# Patient Record
(demographics unavailable — no encounter records)

---

## 2025-02-12 NOTE — DISCUSSION/SUMMARY
[de-identified] : After thorough history full examination and review of the x-rays.  It was explained to the patient that she does have some mild joint effusion.  With some small evidence of osteophyte/arthritic changes.  She was explained the different modalities of treatment.  Since she is an avid walker and does complain of some swelling.  She was explained that there may be a small tear within the meniscus or just an aggravation from her arthritic changes.  As this may improve but also return depending on her activities.  She was explained that further diagnostic testing is warranted including an MRI of the right knee.  For which she stated she would like to hold off at this particular time.  Since she does not experience actual pain.  She states that she would like to hopefully control some of the swelling and return back to her normal activities.  She was explained the different ways of receiving anti-inflammatories which could be through creams as well as oral as well as injections.  And it was decided that we will treat the most conservative way with Voltaren creams.  She was advised to continue with leg elevation along with ice packs/moist heat and the creams as needed.  She was it was also suggested that she can try the anti-inflammatories as needed as well should she feel any discomfort.  She is presently scheduled to go for another long walk/hike in another week.  And explained that if there is any cartilage/meniscus type discomfort this may be something she will experience again and again.  However it is decided that we will continue conservative management and should this pain persist or worsen return back to the office for a possible injection or MRI to evaluate the cartilage thoroughly.  But at the present time she will continue with this conservative management.  45 minutes were spent, face to face, in direct consultation with the patient. This includes reviewing the natural history of their Dx., eliciting the history, performing an orthopedic exam, review of the x-ray findings, forming a differential Dx and discussing all treatment options. This Includes both surgical and non-surgical treatments. I also reviewed all the risks and benefits of non-operative & operative Tx options, future impact into orthopedic functions/problems, activity restrictions both at home and at work, and all follow up requirements.

## 2025-02-12 NOTE — PHYSICAL EXAM
[de-identified] : On physical examination of the right knee.  The skin is clean dry and intact with no areas of redness heat or discharge noted.  There is no evidence of infection superficial or deep.  However there is some mild joint effusion noted.  Mostly just superior of the patella.  Overall she does have an adequate range of motion.  She is able to fully extend the knee and flex the knee towards 125 degrees plus.  This is done both passive and actively.  There is no evidence of ligamentous laxity.  As this was tested in both anterior and posterior drawer test.  No evidence of any muscle atrophy.  No evidence of any motor or sensory deficit.  She does not present with any increased discomfort when performing Susanne's compression or even the squat test suggesting a meniscus tear. [de-identified] : X-rays of the right knee were taken in the office today.  This included 3 views.  The AP standing as well as the lateral and sunrise views.  It does show some mild narrowing of the joint spacing with some small osteophyte formation.  More noticeably on the lateral compartment as well as posteriorly.

## 2025-02-12 NOTE — PHYSICAL EXAM
[de-identified] : On physical examination of the right knee.  The skin is clean dry and intact with no areas of redness heat or discharge noted.  There is no evidence of infection superficial or deep.  However there is some mild joint effusion noted.  Mostly just superior of the patella.  Overall she does have an adequate range of motion.  She is able to fully extend the knee and flex the knee towards 125 degrees plus.  This is done both passive and actively.  There is no evidence of ligamentous laxity.  As this was tested in both anterior and posterior drawer test.  No evidence of any muscle atrophy.  No evidence of any motor or sensory deficit.  She does not present with any increased discomfort when performing Susanne's compression or even the squat test suggesting a meniscus tear. [de-identified] : X-rays of the right knee were taken in the office today.  This included 3 views.  The AP standing as well as the lateral and sunrise views.  It does show some mild narrowing of the joint spacing with some small osteophyte formation.  More noticeably on the lateral compartment as well as posteriorly.

## 2025-02-12 NOTE — HISTORY OF PRESENT ILLNESS
[de-identified] : Patient is a 75-year-old female who presents today for an evaluation regarding her right knee.  She states that she is an avid walker and on her last hike.  She noticed that she had some swelling in her knee.  She denies any specific injury or accident and stated that it was more noticeable as the day progressed.  Since that time she is has felt a slight improvement.  But does notice a small amount of swelling still in her right knee.  She denies buckling locking or even an increase in pain.

## 2025-02-12 NOTE — HISTORY OF PRESENT ILLNESS
[de-identified] : Patient is a 75-year-old female who presents today for an evaluation regarding her right knee.  She states that she is an avid walker and on her last hike.  She noticed that she had some swelling in her knee.  She denies any specific injury or accident and stated that it was more noticeable as the day progressed.  Since that time she is has felt a slight improvement.  But does notice a small amount of swelling still in her right knee.  She denies buckling locking or even an increase in pain.

## 2025-02-12 NOTE — DISCUSSION/SUMMARY
[de-identified] : After thorough history full examination and review of the x-rays.  It was explained to the patient that she does have some mild joint effusion.  With some small evidence of osteophyte/arthritic changes.  She was explained the different modalities of treatment.  Since she is an avid walker and does complain of some swelling.  She was explained that there may be a small tear within the meniscus or just an aggravation from her arthritic changes.  As this may improve but also return depending on her activities.  She was explained that further diagnostic testing is warranted including an MRI of the right knee.  For which she stated she would like to hold off at this particular time.  Since she does not experience actual pain.  She states that she would like to hopefully control some of the swelling and return back to her normal activities.  She was explained the different ways of receiving anti-inflammatories which could be through creams as well as oral as well as injections.  And it was decided that we will treat the most conservative way with Voltaren creams.  She was advised to continue with leg elevation along with ice packs/moist heat and the creams as needed.  She was it was also suggested that she can try the anti-inflammatories as needed as well should she feel any discomfort.  She is presently scheduled to go for another long walk/hike in another week.  And explained that if there is any cartilage/meniscus type discomfort this may be something she will experience again and again.  However it is decided that we will continue conservative management and should this pain persist or worsen return back to the office for a possible injection or MRI to evaluate the cartilage thoroughly.  But at the present time she will continue with this conservative management.  45 minutes were spent, face to face, in direct consultation with the patient. This includes reviewing the natural history of their Dx., eliciting the history, performing an orthopedic exam, review of the x-ray findings, forming a differential Dx and discussing all treatment options. This Includes both surgical and non-surgical treatments. I also reviewed all the risks and benefits of non-operative & operative Tx options, future impact into orthopedic functions/problems, activity restrictions both at home and at work, and all follow up requirements.

## 2025-03-31 NOTE — PHYSICAL EXAM
[Alert] : alert [EOMI] : extraocular movements were intact [Normal Appearance] : the appearance of the neck was normal [Supple] : the neck was supple [No Respiratory Distress] : no respiratory distress [No Acc Muscle Use] : no accessory muscle use [Respiration, Rhythm And Depth] : normal respiratory rhythm and effort [Auscultation Breath Sounds / Voice Sounds] : lungs were clear to auscultation bilaterally [Normal S1, S2] : normal S1 and S2 [Heart Rate And Rhythm] : heart rate was normal and rhythm regular [___cm] : a ~M [unfilled] ~Ucm superior lateral quadrant mass was palpated [Abdomen Tenderness] : non-tender [Abdomen Soft] : soft [Normal Gait] : normal gait [Normal Color / Pigmentation] : normal skin color and pigmentation [No Focal Deficits] : no focal deficits [Oriented To Time, Place, And Person] : oriented to person, place, and time [Normal Affect] : the affect was normal [Normal Insight/Judgment] : insight and judgment were intact [Normal Mood] : the mood was normal

## 2025-04-01 NOTE — ASSESSMENT
[FreeTextEntry1] : Neck pain - Pain is improved. Patient is doing the physical therapy exercises at home. follow up with ortopedist if any symptoms.   Insomnia - on trazadone prn. advised can also use small dose of melatonin to initiate the sleep process.   HLD- c/w  atorvastatin 40mg daily.  HM -Vaccine information in chart. Orders and referral provided as below.   Patient is here for episodic care. All patient questions answered today and understood by patient. Henceforth, Patient to schedule follow up appointments to discuss results and update plan of care, and if new symptoms, questions, renewals or health concerns.

## 2025-04-01 NOTE — REVIEW OF SYSTEMS
[Sleep Disturbances] : sleep disturbances [Fever] : no fever [Chills] : no chills [Heart Rate Is Slow] : the heart rate was not slow [Heart Rate Is Fast] : the heart rate was not fast [Chest Pain] : no chest pain [Palpitations] : no palpitations [Lower Ext Edema] : no lower extremity edema [Shortness Of Breath] : no shortness of breath [Wheezing] : no wheezing [Cough] : no cough [Abdominal Pain] : no abdominal pain [Vomiting] : no vomiting [Constipation] : no constipation [Diarrhea] : no diarrhea [Dysuria] : no dysuria [As Noted in HPI] : as noted in HPI [Confused] : no confusion [Dizziness] : no dizziness [Anxiety] : no anxiety [Depression] : no depression

## 2025-04-01 NOTE — REVIEW OF SYSTEMS
[Sleep Disturbances] : sleep disturbances [Fever] : no fever [Chills] : no chills [Heart Rate Is Slow] : the heart rate was not slow [Heart Rate Is Fast] : the heart rate was not fast [Chest Pain] : no chest pain [Lower Ext Edema] : no lower extremity edema [Palpitations] : no palpitations [Shortness Of Breath] : no shortness of breath [Wheezing] : no wheezing [Cough] : no cough [Abdominal Pain] : no abdominal pain [Vomiting] : no vomiting [Constipation] : no constipation [Diarrhea] : no diarrhea [Dysuria] : no dysuria [As Noted in HPI] : as noted in HPI [Confused] : no confusion [Dizziness] : no dizziness [Anxiety] : no anxiety [Depression] : no depression

## 2025-04-01 NOTE — HISTORY OF PRESENT ILLNESS
[Patient reported osteoporosis screening was abnormal] : Patient reported osteoporosis screening was abnormal [Patient reported colon/rectal/cancer screening was normal] : Patient reported colon/rectal cancer screening was normal [Patient reported breast cancer screening was normal] : Patient reported breast cancer screening was normal [No falls in past year] : Patient reported no falls in the past year [Completely Independent] : Completely independent. [Little interest or pleasure doing things] : 1) Little interest or pleasure doing things [Feeling down, depressed, or hopeless] : 2) Feeling down, depressed, or hopeless [0] : 2) Feeling down, depressed, or hopeless: Not at all (0) [PHQ-2 Negative - No further assessment needed] : PHQ-2 Negative - No further assessment needed [FreeTextEntry1] : Ms. OLIVIER ESPINOSA is a 75 year old woman with pmhx of HLD, prediabetes, insomnia and family history of breast ca and ovarian ca presents to the office for a follow up visit.   She endorses being in overall good health and mood. She is taking medications as prescribed.  She is due for blood test. She saw ortho in the past,  did not use diclofenac. She has occasional neck pain, advised to follow up with ortho if any symptoms.    Social hx: , lives alone in a condo. Has 3 children that are very involved. Independent in all ADLs and iADLs. [BreastSonogramDate] : 09/05/2023 [TextBox_25] : osteopenia  [Mammogramdate] : 04/04/2022 [TextBox_19] : q3 years  [FreeTextEntry3] : 09/05/2023 [FreeTextEntry6] : normal in the past  [WVA2Dacxd] : 0

## 2025-04-01 NOTE — HISTORY OF PRESENT ILLNESS
[Patient reported osteoporosis screening was abnormal] : Patient reported osteoporosis screening was abnormal [Patient reported colon/rectal/cancer screening was normal] : Patient reported colon/rectal cancer screening was normal [Patient reported breast cancer screening was normal] : Patient reported breast cancer screening was normal [No falls in past year] : Patient reported no falls in the past year [Completely Independent] : Completely independent. [Little interest or pleasure doing things] : 1) Little interest or pleasure doing things [Feeling down, depressed, or hopeless] : 2) Feeling down, depressed, or hopeless [0] : 2) Feeling down, depressed, or hopeless: Not at all (0) [PHQ-2 Negative - No further assessment needed] : PHQ-2 Negative - No further assessment needed [FreeTextEntry1] : Ms. OLIVIER ESPINOSA is a 75 year old woman with pmhx of HLD, prediabetes, insomnia and family history of breast ca and ovarian ca presents to the office for a follow up visit.   She endorses being in overall good health and mood. She is taking medications as prescribed.  She is due for blood test. She saw ortho in the past,  did not use diclofenac. She has occasional neck pain, advised to follow up with ortho if any symptoms.    Social hx: , lives alone in a condo. Has 3 children that are very involved. Independent in all ADLs and iADLs. [BreastSonogramDate] : 09/05/2023 [TextBox_25] : osteopenia  [Mammogramdate] : 04/04/2022 [TextBox_19] : q3 years  [FreeTextEntry3] : 09/05/2023 [FreeTextEntry6] : normal in the past  [BAS8Umzrs] : 0